# Patient Record
Sex: MALE | Race: WHITE | Employment: OTHER | URBAN - METROPOLITAN AREA
[De-identification: names, ages, dates, MRNs, and addresses within clinical notes are randomized per-mention and may not be internally consistent; named-entity substitution may affect disease eponyms.]

---

## 2019-01-01 ENCOUNTER — DISCHARGE SUMMARY NURSING HOME (OUTPATIENT)
Dept: GERIATRICS | Facility: CLINIC | Age: 84
End: 2019-01-01
Payer: COMMERCIAL

## 2019-01-01 ENCOUNTER — NURSING HOME VISIT (OUTPATIENT)
Dept: GERIATRICS | Facility: CLINIC | Age: 84
End: 2019-01-01
Payer: COMMERCIAL

## 2019-01-01 ENCOUNTER — HOSPITAL LABORATORY (OUTPATIENT)
Dept: NURSING HOME | Facility: OTHER | Age: 84
End: 2019-01-01

## 2019-01-01 ENCOUNTER — TELEPHONE (OUTPATIENT)
Dept: GERIATRICS | Facility: CLINIC | Age: 84
End: 2019-01-01

## 2019-01-01 ENCOUNTER — NURSING HOME VISIT (OUTPATIENT)
Dept: GERIATRICS | Facility: CLINIC | Age: 84
End: 2019-01-01
Payer: MEDICARE

## 2019-01-01 VITALS
HEART RATE: 70 BPM | RESPIRATION RATE: 18 BRPM | OXYGEN SATURATION: 96 % | DIASTOLIC BLOOD PRESSURE: 80 MMHG | HEIGHT: 72 IN | BODY MASS INDEX: 22.55 KG/M2 | SYSTOLIC BLOOD PRESSURE: 153 MMHG | WEIGHT: 166.5 LBS | TEMPERATURE: 97.3 F

## 2019-01-01 VITALS
BODY MASS INDEX: 21.66 KG/M2 | TEMPERATURE: 98 F | DIASTOLIC BLOOD PRESSURE: 70 MMHG | SYSTOLIC BLOOD PRESSURE: 140 MMHG | OXYGEN SATURATION: 93 % | WEIGHT: 159.7 LBS | HEART RATE: 60 BPM | RESPIRATION RATE: 18 BRPM

## 2019-01-01 VITALS
TEMPERATURE: 97.4 F | WEIGHT: 165.5 LBS | SYSTOLIC BLOOD PRESSURE: 122 MMHG | RESPIRATION RATE: 18 BRPM | BODY MASS INDEX: 22.42 KG/M2 | OXYGEN SATURATION: 94 % | HEIGHT: 72 IN | HEART RATE: 78 BPM | DIASTOLIC BLOOD PRESSURE: 58 MMHG

## 2019-01-01 VITALS
OXYGEN SATURATION: 98 % | RESPIRATION RATE: 16 BRPM | TEMPERATURE: 98.4 F | SYSTOLIC BLOOD PRESSURE: 124 MMHG | HEART RATE: 62 BPM | DIASTOLIC BLOOD PRESSURE: 60 MMHG

## 2019-01-01 VITALS
RESPIRATION RATE: 19 BRPM | DIASTOLIC BLOOD PRESSURE: 46 MMHG | OXYGEN SATURATION: 97 % | HEART RATE: 56 BPM | HEIGHT: 72 IN | SYSTOLIC BLOOD PRESSURE: 115 MMHG | BODY MASS INDEX: 23.43 KG/M2 | TEMPERATURE: 97.4 F | WEIGHT: 173 LBS

## 2019-01-01 DIAGNOSIS — Z79.899 POLYPHARMACY: ICD-10-CM

## 2019-01-01 DIAGNOSIS — E11.9 TYPE 2 DIABETES MELLITUS WITHOUT COMPLICATION, WITHOUT LONG-TERM CURRENT USE OF INSULIN (H): ICD-10-CM

## 2019-01-01 DIAGNOSIS — C91.10 CHRONIC LYMPHOCYTIC LEUKEMIA (H): ICD-10-CM

## 2019-01-01 DIAGNOSIS — M62.81 GENERALIZED MUSCLE WEAKNESS: ICD-10-CM

## 2019-01-01 DIAGNOSIS — C78.7 PANCREATIC CANCER METASTASIZED TO LIVER (H): ICD-10-CM

## 2019-01-01 DIAGNOSIS — C25.9 PANCREATIC CANCER METASTASIZED TO LIVER (H): ICD-10-CM

## 2019-01-01 DIAGNOSIS — R79.89 ELEVATED TROPONIN: ICD-10-CM

## 2019-01-01 DIAGNOSIS — R41.89 COGNITIVE IMPAIRMENT: ICD-10-CM

## 2019-01-01 DIAGNOSIS — N40.1 BENIGN PROSTATIC HYPERPLASIA WITH INCOMPLETE BLADDER EMPTYING: Primary | ICD-10-CM

## 2019-01-01 DIAGNOSIS — E11.9 TYPE 2 DIABETES MELLITUS WITHOUT COMPLICATION, WITHOUT LONG-TERM CURRENT USE OF INSULIN (H): Primary | ICD-10-CM

## 2019-01-01 DIAGNOSIS — Z51.5 HOSPICE CARE PATIENT: ICD-10-CM

## 2019-01-01 DIAGNOSIS — I48.20 CHRONIC ATRIAL FIBRILLATION (H): ICD-10-CM

## 2019-01-01 DIAGNOSIS — N40.1 BENIGN PROSTATIC HYPERPLASIA WITH INCOMPLETE BLADDER EMPTYING: ICD-10-CM

## 2019-01-01 DIAGNOSIS — C25.9 PANCREATIC CANCER METASTASIZED TO LIVER (H): Primary | ICD-10-CM

## 2019-01-01 DIAGNOSIS — Z86.718 HISTORY OF DVT (DEEP VEIN THROMBOSIS): ICD-10-CM

## 2019-01-01 DIAGNOSIS — I21.4 NSTEMI (NON-ST ELEVATED MYOCARDIAL INFARCTION) (H): ICD-10-CM

## 2019-01-01 DIAGNOSIS — Z97.8 FOLEY CATHETER PRESENT: ICD-10-CM

## 2019-01-01 DIAGNOSIS — R39.14 BENIGN PROSTATIC HYPERPLASIA WITH INCOMPLETE BLADDER EMPTYING: Primary | ICD-10-CM

## 2019-01-01 DIAGNOSIS — R39.14 BENIGN PROSTATIC HYPERPLASIA WITH INCOMPLETE BLADDER EMPTYING: ICD-10-CM

## 2019-01-01 DIAGNOSIS — C78.7 PANCREATIC CANCER METASTASIZED TO LIVER (H): Primary | ICD-10-CM

## 2019-01-01 LAB
ANION GAP SERPL CALCULATED.3IONS-SCNC: 7 MMOL/L (ref 3–14)
BUN SERPL-MCNC: 12 MG/DL (ref 7–30)
CALCIUM SERPL-MCNC: 8.9 MG/DL (ref 8.5–10.1)
CHLORIDE SERPL-SCNC: 101 MMOL/L (ref 94–109)
CO2 SERPL-SCNC: 27 MMOL/L (ref 20–32)
CREAT SERPL-MCNC: 0.68 MG/DL (ref 0.66–1.25)
ERYTHROCYTE [DISTWIDTH] IN BLOOD BY AUTOMATED COUNT: 15.3 % (ref 10–15)
ERYTHROCYTE [DISTWIDTH] IN BLOOD BY AUTOMATED COUNT: 15.9 % (ref 10–15)
GFR SERPL CREATININE-BSD FRML MDRD: 84 ML/MIN/{1.73_M2}
GLUCOSE SERPL-MCNC: 165 MG/DL (ref 70–99)
HCT VFR BLD AUTO: 30.6 % (ref 40–53)
HCT VFR BLD AUTO: 31 % (ref 40–53)
HGB BLD-MCNC: 9.5 G/DL (ref 13.3–17.7)
HGB BLD-MCNC: 9.7 G/DL (ref 13.3–17.7)
MCH RBC QN AUTO: 26.8 PG (ref 26.5–33)
MCH RBC QN AUTO: 26.8 PG (ref 26.5–33)
MCHC RBC AUTO-ENTMCNC: 31 G/DL (ref 31.5–36.5)
MCHC RBC AUTO-ENTMCNC: 31.3 G/DL (ref 31.5–36.5)
MCV RBC AUTO: 86 FL (ref 78–100)
MCV RBC AUTO: 86 FL (ref 78–100)
PLATELET # BLD AUTO: 307 10E9/L (ref 150–450)
PLATELET # BLD AUTO: 416 10E9/L (ref 150–450)
POTASSIUM SERPL-SCNC: 4.1 MMOL/L (ref 3.4–5.3)
RBC # BLD AUTO: 3.54 10E12/L (ref 4.4–5.9)
RBC # BLD AUTO: 3.62 10E12/L (ref 4.4–5.9)
SODIUM SERPL-SCNC: 135 MMOL/L (ref 133–144)
WBC # BLD AUTO: 26.4 10E9/L (ref 4–11)
WBC # BLD AUTO: 29.9 10E9/L (ref 4–11)

## 2019-01-01 PROCEDURE — 99305 1ST NF CARE MODERATE MDM 35: CPT | Mod: GW | Performed by: FAMILY MEDICINE

## 2019-01-01 PROCEDURE — 99309 SBSQ NF CARE MODERATE MDM 30: CPT | Mod: GW | Performed by: NURSE PRACTITIONER

## 2019-01-01 PROCEDURE — 99310 SBSQ NF CARE HIGH MDM 45: CPT | Performed by: NURSE PRACTITIONER

## 2019-01-01 PROCEDURE — 99316 NF DSCHRG MGMT 30 MIN+: CPT | Performed by: NURSE PRACTITIONER

## 2019-01-01 PROCEDURE — 99309 SBSQ NF CARE MODERATE MDM 30: CPT | Performed by: NURSE PRACTITIONER

## 2019-01-01 RX ORDER — ACETAMINOPHEN 325 MG/1
650 TABLET ORAL EVERY 4 HOURS PRN
COMMUNITY

## 2019-01-01 RX ORDER — ACETAMINOPHEN 500 MG
500 TABLET ORAL 3 TIMES DAILY
COMMUNITY

## 2019-01-01 RX ORDER — SENNOSIDES A AND B 8.6 MG/1
1 TABLET, FILM COATED ORAL 2 TIMES DAILY PRN
COMMUNITY
End: 2019-01-01

## 2019-01-01 RX ORDER — DOCUSATE SODIUM 100 MG/1
100 CAPSULE, LIQUID FILLED ORAL 2 TIMES DAILY PRN
COMMUNITY
End: 2019-01-01

## 2019-01-01 RX ORDER — CALCIUM CARBONATE/VITAMIN D3 500-10/5ML
1 LIQUID (ML) ORAL DAILY
COMMUNITY

## 2019-01-01 RX ORDER — CINNAMON BARK
1000 POWDER (GRAM) MISCELLANEOUS DAILY
COMMUNITY

## 2019-01-01 RX ORDER — POLYETHYLENE GLYCOL 3350 17 G/17G
17 POWDER, FOR SOLUTION ORAL DAILY PRN
COMMUNITY
End: 2019-01-01

## 2019-01-01 RX ORDER — MORPHINE SULFATE 30 MG/1
2.5 TABLET ORAL
COMMUNITY

## 2019-01-01 ASSESSMENT — MIFFLIN-ST. JEOR
SCORE: 1453.7
SCORE: 1487.72
SCORE: 1458.24

## 2019-05-23 NOTE — LETTER
5/23/2019        RE: Gerber Mayen  [address] [facility] [id number]        [facility]  PRIMARY CARE PROVIDER AND CLINIC:  [facility], [address] [facility] [address]  Chief Complaint   Patient presents with     Hospital F/U     [facility] Medical Record Number:  [id number]  Place of Service where encounter took place:  [facility] ([facility]) [[id number]]    Gerber Mayen  is a 89 year old  (11/30/1929), admitted to the above facility from  [facility] . Hospital stay 5/14/19 through 5/22/19..  Admitted to this facility for  rehab, medical management and nursing care.    HPI:    HPI information obtained from: facility chart records, facility staff, patient report, Care Everywhere Epic chart review and family/first contact son report.   Brief Summary of Hospital Course: Gerber presented to [facility] on 5/13/19 with inability to pass urine, abdominal pain and hematuria.  Found to have a WBC = 36.8.  CT showed enlarged prostate, mesenteric adenopathy and a soft tissue mass in the epigastrium measuring approx 6.4cm which was larger than measured in 2009.  multple liver lesions suggesting metastatic disease and a subacute L1 compression fracture.  Eventually transferred to [facility] due to elevated troponins without chest pain, ongoing bleeding and concerns for cardiac ischemia.    CT A/P urogram showed large upper retroperitoneal mass diffuse metastatic disease to liver.  Oncology consulted but family declined liver biopsy.  Code status discussed with patient wanting to be DNR.  Family declined Hospice before discharge from [facility].    Clinical picture suggested acute NSTEMI, but with metastatic disease, cardiology did not recommend further cardiac testing.  Updates on Status Since Skilled nursing Admission: Today meeting Gerber as he is laying on his bed.  Able to answer questions but can see the  "memory impairment with conversation.  Did call the son to introduce self and he is aware of how his story changes and seems to not be able to make some decisions.    Denied chest pain, SOB or other joint pain.  Stated his nostril bothered him but stated he thought it was his sinuses and then he denied that story.    Catheter is present and drain bag is empty.  Catheter is to be pulled in five days to see if able to void.    Goal is to get him stronger and return back to his apartment in [place] with his spouse.  Lives near the hospital in one level \"[facility]/apartment\".    Did not bring up hospice with son.  Continue to monitor how Gerber functions and can guide family what will be best for him.    CODE STATUS/ADVANCE DIRECTIVES DISCUSSION:   DNR / DNI  Patient's living condition: lives with spouse  ALLERGIES: Codeine; Diatrizoate; and Iodine  PAST MEDICAL HISTORY:  has no past medical history on file.  PAST SURGICAL HISTORY:   has a past surgical history that includes REMV CATARACT EXTRACAP,INSERT LENS (8/21/2008) and REMV CATARACT EXTRACAP,INSERT LENS (09/16/10).  FAMILY HISTORY: family history is not on file.  SOCIAL HISTORY:       Post Discharge Medication Reconciliation Status: discharge medications reconciled, continue medications without change    Current Outpatient Medications   Medication Sig Dispense Refill     acetaminophen (TYLENOL) 325 MG tablet Take 325-650 mg by mouth every 4 hours as needed for mild pain       Cinnamon Bark POWD Take 1,000 mg by mouth daily       CRANBERRY PO Take 1 tablet by mouth daily       docusate sodium (COLACE) 100 MG capsule Take 100 mg by mouth 2 times daily as needed for constipation       insulin aspart (NOVOLOG FLEXPEN) 100 UNIT/ML pen Inject Subcutaneous 3 times daily (with meals) Per Sliding Scale;   If Blood Sugar is 200 to 249, give 1 Units.  If Blood Sugar is 250 to 299, give 2 Units.  If Blood Sugar is 300 to 349, give 3 Units.  If Blood Sugar is 350 to 399, give 4 " Units.  If Blood Sugar is greater than 399, give 5 Units.  If Blood Sugar is greater than 400, call MD.       metFORMIN (GLUCOPHAGE) 1000 MG tablet Take 1,000 mg by mouth 2 times daily (with meals)       Multiple Vitamins-Minerals (MULTIVITAMIN ADULT PO) Take 1 tablet by mouth daily       polyethylene glycol (MIRALAX/GLYCOLAX) packet Take 17 g by mouth daily as needed for constipation       senna (SENOKOT) 8.6 MG tablet Take 1 tablet by mouth 2 times daily as needed for constipation       SUPER B COMPLEX/C PO Take 1 tablet by mouth daily        vitamin B-12 (CYANOCOBALAMIN) 1000 MCG tablet Take 1,000 mcg by mouth daily       vitamin D3 (CHOLECALCIFEROL) 1000 units (25 mcg) tablet Take 2,000 Units by mouth daily         ROS:  10 point ROS of systems including Constitutional, Eyes, Respiratory, Cardiovascular, Gastroenterology, Genitourinary, Integumentary, Musculoskeletal, Psychiatric were all negative except for pertinent positives noted in my HPI.    Vitals:  /58   Pulse 78   Temp 97.4  F (36.3  C)   Resp 18   Ht 1.829 m (6')   Wt 75.1 kg (165 lb 8 oz)   SpO2 94%   BMI 22.45 kg/m     Exam:  GENERAL APPEARANCE:  Alert, in no distress, cooperative, tall gentleman  EYES:  PERRL, Conjunctiva and lids normal  RESP:  respiratory effort and palpation of chest normal, lungs clear to auscultation , no respiratory distress  CV:  Palpation and auscultation of heart done , no edema, heart rate regular/irregular  ABDOMEN:  normal bowel sounds, soft, nontender, no hepatosplenomegaly or other masses, no guarding or rebound  M/S:   Gait and station abnormal staff assisting (one) with transfers and using w/c for mobility around unit.  independent with bed mobility  SKIN:  pale, warm and dry.  lower legs are discolored but no open sores  PSYCH:  oriented to person, vague with place and time, insight and judgement impaired, memory impaired , affect and mood normal    Lab/Diagnostic data:  5/20/19  K+ = 3.8, Mg =  1.7    ALBUMIN 3.2 3.2 - 4.6 g/dL [facility]     PROTEIN,TOTAL 5.3 (L) 6.0 - 8.0 g/dL [facility]     GLOBULIN  2.1 2.0 - 3.7 g/dL [facility]     A/G RATIO 1.5 1.0 - 2.0  [facility]     BILIRUBIN,TOTAL 0.4 0.2 - 1.2 mg/dL [facility]     BILIRUBIN,DIRECT 0.2 0.1 - 0.5 mg/dL [facility]     BILIRUBIN,INDIRECT  0.2 0.2 - 0.8 mg/dL [facility]     ALK PHOSPHATASE 59 50 - 136 IU/L [facility]     ALT (SGPT) 13 8 - 45 IU/L [facility]     AST (SGOT) 14        CA 19-9 5/15/19: 11 normal  PSA total 5/15/19: 3.04 normal    MICROBIOLOGY  Urine culture 5/13/19: 10-50,000 CFU/mL of multiple organisms, probable contaminants        ASSESSMENT/PLAN:  Benign prostatic hyperplasia with incomplete bladder emptying  Daly catheter present  - goal is for catheter to come out in 5 days.  From there will see if retaining urine or able to void on own.  Suggestion would be to place catheter back in knowing his prostate is enlarged and goal of care may be comfort.  Will discuss urology if needed but at the hospital, irrigation was done.  Catheter was replaced due to inability to void so cautious if he will be able to void.  Is on cranberry tab which clarified to be 500mg daily    Pancreatic cancer metastasized to liver (H) - likely  - hospital attempted to guide family with hospice care but declined.  Got the sense they are not ready emotionally for possible cancer.  Will attend therapies with goal of strengthening and discharge back to his previous living situation.  Spoke with the son who is the main contact.    Keep family informed of his status and answer questions as able.  MD/NP will follow Gerber while here.    Chronic atrial fibrillation (H)  Not  on ASA or beta blocker due to his bleeding and boarderline hypotension and bradycardia  Vitals done daily while here.  Blood pressures 110-130's/60-70's.    Watch for any symptoms.    Cognitive impairment  Does take B12 1000mcg tab daily, super B complex 1 tab daily and MVI daily.  Will need to provide safety and remind him of call light.  After being here for a few days, cognitive assessments will be done.    Type 2 diabetes mellitus without complication, without long-term current use of insulin (H)  Right now blood sugars QID and have ranged from 199-236.  Is on metformin 1000mg BID and came with a sliding scale insulin regimen.  Will keep that for now and eval his sugars next week.  Possible to get rid of the insulin.  Will do a CBC and BMP next Wednesday to evaluate overall labs       Orders written by provider at facility and transcribed by : Reema Cruz MA  1.  CBC & BMP Wednesday.  Dx: DM    Total time spent with patient visit at the [facility] nursing [facility] was 37 minutes including patient visit, review of past records and phone call to patient contact. Greater than 50% of total time spent with counseling and coordinating care due to clairfying medications, review plan of care and orientation to how the MD/NP work     Electronically signed by:  MARIANELA Weeks CNP                         Sincerely,        MARIANELA Weeks CNP

## 2019-05-24 PROBLEM — R79.89 ELEVATED TROPONIN: Status: ACTIVE | Noted: 2019-01-01

## 2019-05-24 PROBLEM — R29.6 FALLS FREQUENTLY: Status: ACTIVE | Noted: 2019-01-01

## 2019-05-24 PROBLEM — C25.9 PANCREATIC CANCER METASTASIZED TO LIVER (H): Status: ACTIVE | Noted: 2019-01-01

## 2019-05-24 PROBLEM — N40.0 BENIGN PROSTATIC HYPERPLASIA: Status: ACTIVE | Noted: 2019-01-01

## 2019-05-24 PROBLEM — E78.5 HYPERLIPIDEMIA: Status: ACTIVE | Noted: 2019-01-01

## 2019-05-24 PROBLEM — T17.908A ASPIRATION INTO AIRWAY: Status: ACTIVE | Noted: 2019-01-01

## 2019-05-24 PROBLEM — C91.10 CHRONIC LYMPHOCYTIC LEUKEMIA (H): Status: ACTIVE | Noted: 2019-01-01

## 2019-05-24 PROBLEM — C78.7 PANCREATIC CANCER METASTASIZED TO LIVER (H): Status: ACTIVE | Noted: 2019-01-01

## 2019-05-24 NOTE — PROGRESS NOTES
[facility]  PRIMARY CARE PROVIDER AND CLINIC:  [facility], [address] [facility] [address]  Chief Complaint   Patient presents with     Hospital F/U     [facility] Medical Record Number:  [id number]  Place of Service where encounter took place:  [facility] [place] ([facility]) [[id number]]    Gerber Mayen  is a 89 year old  (11/30/1929), admitted to the above facility from  [facility] . Hospital stay 5/14/19 through 5/22/19..  Admitted to this facility for  rehab, medical management and nursing care.    HPI:    HPI information obtained from: facility chart records, facility staff, patient report, Care Everywhere Epic chart review and family/first contact son report.   Brief Summary of Hospital Course: Gerber presented to [facility] on 5/13/19 with inability to pass urine, abdominal pain and hematuria.  Found to have a WBC = 36.8.  CT showed enlarged prostate, mesenteric adenopathy and a soft tissue mass in the epigastrium measuring approx 6.4cm which was larger than measured in 2009.  multple liver lesions suggesting metastatic disease and a subacute L1 compression fracture.  Eventually transferred to [facility] due to elevated troponins without chest pain, ongoing bleeding and concerns for cardiac ischemia.    CT A/P urogram showed large upper retroperitoneal mass diffuse metastatic disease to liver.  Oncology consulted but family declined liver biopsy.  Code status discussed with patient wanting to be DNR.  Family declined Hospice before discharge from [facility].    Clinical picture suggested acute NSTEMI, but with metastatic disease, cardiology did not recommend further cardiac testing.  Updates on Status Since Skilled nursing Admission: Today meeting Gerber as he is laying on his bed.  Able to answer questions but can see the memory impairment with conversation.  Did call the son to introduce self and he is aware of how his story  "changes and seems to not be able to make some decisions.    Denied chest pain, SOB or other joint pain.  Stated his nostril bothered him but stated he thought it was his sinuses and then he denied that story.    Catheter is present and drain bag is empty.  Catheter is to be pulled in five days to see if able to void.    Goal is to get him stronger and return back to his apartment in [place] with his spouse.  [facility]    Did not bring up hospice with son.  Continue to monitor how Gerber functions and can guide family what will be best for him.    CODE STATUS/ADVANCE DIRECTIVES DISCUSSION:   DNR / DNI  Patient's living condition: lives with spouse  ALLERGIES: Codeine; Diatrizoate; and Iodine  PAST MEDICAL HISTORY:  has no past medical history on file.  PAST SURGICAL HISTORY:   has a past surgical history that includes REMV CATARACT EXTRACAP,INSERT LENS (8/21/2008) and REMV CATARACT EXTRACAP,INSERT LENS (09/16/10).  FAMILY HISTORY: family history is not on file.  SOCIAL HISTORY:       Post Discharge Medication Reconciliation Status: discharge medications reconciled, continue medications without change    Current Outpatient Medications   Medication Sig Dispense Refill     acetaminophen (TYLENOL) 325 MG tablet Take 325-650 mg by mouth every 4 hours as needed for mild pain       Cinnamon Bark POWD Take 1,000 mg by mouth daily       CRANBERRY PO Take 1 tablet by mouth daily       docusate sodium (COLACE) 100 MG capsule Take 100 mg by mouth 2 times daily as needed for constipation       insulin aspart (NOVOLOG FLEXPEN) 100 UNIT/ML pen Inject Subcutaneous 3 times daily (with meals) Per Sliding Scale;   If Blood Sugar is 200 to 249, give 1 Units.  If Blood Sugar is 250 to 299, give 2 Units.  If Blood Sugar is 300 to 349, give 3 Units.  If Blood Sugar is 350 to 399, give 4 Units.  If Blood Sugar is greater than 399, give 5 Units.  If Blood Sugar is greater than 400, call MD.   "     metFORMIN (GLUCOPHAGE) 1000 MG tablet Take 1,000 mg by mouth 2 times daily (with meals)       Multiple Vitamins-Minerals (MULTIVITAMIN ADULT PO) Take 1 tablet by mouth daily       polyethylene glycol (MIRALAX/GLYCOLAX) packet Take 17 g by mouth daily as needed for constipation       senna (SENOKOT) 8.6 MG tablet Take 1 tablet by mouth 2 times daily as needed for constipation       SUPER B COMPLEX/C PO Take 1 tablet by mouth daily        vitamin B-12 (CYANOCOBALAMIN) 1000 MCG tablet Take 1,000 mcg by mouth daily       vitamin D3 (CHOLECALCIFEROL) 1000 units (25 mcg) tablet Take 2,000 Units by mouth daily         ROS:  10 point ROS of systems including Constitutional, Eyes, Respiratory, Cardiovascular, Gastroenterology, Genitourinary, Integumentary, Musculoskeletal, Psychiatric were all negative except for pertinent positives noted in my HPI.    Vitals:  /58   Pulse 78   Temp 97.4  F (36.3  C)   Resp 18   Ht 1.829 m (6')   Wt 75.1 kg (165 lb 8 oz)   SpO2 94%   BMI 22.45 kg/m    Exam:  GENERAL APPEARANCE:  Alert, in no distress, cooperative, tall gentleman  EYES:  PERRL, Conjunctiva and lids normal  RESP:  respiratory effort and palpation of chest normal, lungs clear to auscultation , no respiratory distress  CV:  Palpation and auscultation of heart done , no edema, heart rate regular/irregular  ABDOMEN:  normal bowel sounds, soft, nontender, no hepatosplenomegaly or other masses, no guarding or rebound  M/S:   Gait and station abnormal staff assisting (one) with transfers and using w/c for mobility around unit.  independent with bed mobility  SKIN:  pale, warm and dry.  lower legs are discolored but no open sores  PSYCH:  oriented to person, vague with place and time, insight and judgement impaired, memory impaired , affect and mood normal    Lab/Diagnostic data:  5/20/19  K+ = 3.8, Mg = 1.7    ALBUMIN 3.2 3.2 - 4.6 g/dL [facility]     PROTEIN,TOTAL 5.3 (L) 6.0 - 8.0  g/dL [facility]     GLOBULIN  2.1 2.0 - 3.7 g/dL [facility]     A/G RATIO 1.5 1.0 - 2.0  [facility]     BILIRUBIN,TOTAL 0.4 0.2 - 1.2 mg/dL [facility]     BILIRUBIN,DIRECT 0.2 0.1 - 0.5 mg/dL [facility]     BILIRUBIN,INDIRECT  0.2 0.2 - 0.8 mg/dL [facility]     ALK PHOSPHATASE 59 50 - 136 IU/L [facility]     ALT (SGPT) 13 8 - 45 IU/L [facility]     AST (SGOT) 14        CA 19-9 5/15/19: 11 normal  PSA total 5/15/19: 3.04 normal    MICROBIOLOGY  Urine culture 5/13/19: 10-50,000 CFU/mL of multiple organisms, probable contaminants        ASSESSMENT/PLAN:  Benign prostatic hyperplasia with incomplete bladder emptying  Daly catheter present  - goal is for catheter to come out in 5 days.  From there will see if retaining urine or able to void on own.  Suggestion would be to place catheter back in knowing his prostate is enlarged and goal of care may be comfort.  Will discuss urology if needed but at the hospital, irrigation was done.  Catheter was replaced due to inability to void so cautious if he will be able to void.  Is on cranberry tab which clarified to be 500mg daily    Pancreatic cancer metastasized to liver (H) - likely  - hospital attempted to guide family with hospice care but declined.  Got the sense they are not ready emotionally for possible cancer.  Will attend therapies with goal of strengthening and discharge back to his previous living situation.  Spoke with the son who is the main contact.    Keep family informed of his status and answer questions as able.  MD/NP will follow Gerber while here.    Chronic atrial fibrillation (H)  Not on ASA or beta blocker due to his bleeding and boarderline hypotension and bradycardia  Vitals done daily while  here.  Blood pressures 110-130's/60-70's.    Watch for any symptoms.    Cognitive impairment  Does take B12 1000mcg tab daily, super B complex 1 tab daily and MVI daily.  Will need to provide safety and remind him of call light.  After being here for a few days, cognitive assessments will be done.    Type 2 diabetes mellitus without complication, without long-term current use of insulin (H)  Right now blood sugars QID and have ranged from 199-236.  Is on metformin 1000mg BID and came with a sliding scale insulin regimen.  Will keep that for now and eval his sugars next week.  Possible to get rid of the insulin.  Will do a CBC and BMP next Wednesday to evaluate overall labs       Orders written by provider at facility and transcribed by : Reema Cruz MA  1.  CBC & BMP Wednesday.  Dx: DM    Total time spent with patient visit at the skilled nursing facility was 37 minutes including patient visit, review of past records and phone call to patient contact. Greater than 50% of total time spent with counseling and coordinating care due to clairfying medications, review plan of care and orientation to how the MD/NP work     Electronically signed by:  MARIANELA Weeks CNP

## 2019-05-29 NOTE — PROGRESS NOTES
[facility]  [facility] Medical Record Number:  [id number]  Place of Service where encounter took place:  [facility] [[id number]]  Chief Complaint   Patient presents with     Nursing Home Acute       HPI:    Gerber Mayen  is a 89 year old (11/30/1929), who is being seen today for an episodic care visit.  HPI information obtained from: facility chart records, facility staff and patient report. Today's concern is:    1. Type 2 diabetes mellitus without complication, without long-term current use of insulin (H)    2. Benign prostatic hyperplasia with incomplete bladder emptying    3. Daly catheter present      - nursing updating this NP that Gerber's catheter is out but retaining urine.  This time >500 via PVR and able to cath him for 450.  Discussing what should be done if he continues to retain large amounts.  Staff also noted his blood sugars elevated and so evaluating them today.  Received a note from nursing asking for some PRN medications to be discontinued due to not needing them.      Past Medical and Surgical History reviewed in Epic today.    MEDICATIONS:  Current Outpatient Medications   Medication Sig Dispense Refill     acetaminophen (TYLENOL) 325 MG tablet Take 325-650 mg by mouth every 4 hours as needed for mild pain       Cinnamon Bark POWD Take 1,000 mg by mouth daily       CRANBERRY PO Take 1 tablet by mouth daily       docusate sodium (COLACE) 100 MG capsule Take 100 mg by mouth 2 times daily as needed for constipation       insulin aspart (NOVOLOG FLEXPEN) 100 UNIT/ML pen Inject Subcutaneous 3 times daily (with meals) Per Sliding Scale;   If Blood Sugar is 200 to 249, give 1 Units.  If Blood Sugar is 250 to 299, give 2 Units.  If Blood Sugar is 300 to 349, give 3 Units.  If Blood Sugar is 350 to 399, give 4 Units.  If Blood Sugar is greater than 399, give 5 Units.  If Blood Sugar is greater than 400, call MD.       insulin glargine (LANTUS SOLOSTAR  PEN) 100 UNIT/ML pen Inject 8 Units Subcutaneous every morning       metFORMIN (GLUCOPHAGE) 1000 MG tablet Take 1,000 mg by mouth 2 times daily (with meals)       Multiple Vitamins-Minerals (MULTIVITAMIN ADULT PO) Take 1 tablet by mouth daily       polyethylene glycol (MIRALAX/GLYCOLAX) packet Take 17 g by mouth daily as needed for constipation       senna (SENOKOT) 8.6 MG tablet Take 1 tablet by mouth 2 times daily as needed for constipation       SUPER B COMPLEX/C PO Take 1 tablet by mouth daily        vitamin B-12 (CYANOCOBALAMIN) 1000 MCG tablet Take 1,000 mcg by mouth daily       vitamin D3 (CHOLECALCIFEROL) 1000 units (25 mcg) tablet Take 2,000 Units by mouth daily         REVIEW OF SYSTEMS:  4 point ROS including Respiratory, CV, GI and , other than that noted in the HPI,  is negative    Objective:  /80   Pulse 70   Temp 97.3  F (36.3  C)   Resp 18   Ht 1.829 m (6')   Wt 75.5 kg (166 lb 8 oz)   SpO2 96%   BMI 22.58 kg/m    Exam:  Tall gentleman up in the wheelchair and propels self with his feet.  Heart rate regular/irregular.  No visible distress.  Lungs are clear.    Abdomen soft and round.  No pain with palpation.    Labs:   Component      Latest Ref Rng & Units 5/29/2019   Sodium      133 - 144 mmol/L 135   Potassium      3.4 - 5.3 mmol/L 4.1   Chloride      94 - 109 mmol/L 101   Carbon Dioxide      20 - 32 mmol/L 27   Anion Gap      3 - 14 mmol/L 7   Glucose      70 - 99 mg/dL 165 (H)   Urea Nitrogen      7 - 30 mg/dL 12   Creatinine      0.66 - 1.25 mg/dL 0.68   GFR Estimate      >60 mL/min/1.73:m2 84   GFR Estimate If Black      >60 mL/min/1.73:m2 >90   Calcium      8.5 - 10.1 mg/dL 8.9   WBC      4.0 - 11.0 10e9/L 26.4 (H)   RBC Count      4.4 - 5.9 10e12/L 3.62 (L)   Hemoglobin      13.3 - 17.7 g/dL 9.7 (L)   Hematocrit      40.0 - 53.0 % 31.0 (L)   MCV      78 - 100 fl 86   MCH      26.5 - 33.0 pg 26.8   MCHC      31.5 - 36.5 g/dL 31.3 (L)   RDW      10.0 - 15.0 % 15.3 (H)   Platelet  Count      150 - 450 10e9/L 416     Blood sugars QID  7am = 190-210's  12N = 270-310's  5pm = 190-240's  HS = 210-310's      ASSESSMENT/PLAN:  1. Type 2 diabetes mellitus without complication, without long-term current use of insulin (H)    2. Benign prostatic hyperplasia with incomplete bladder emptying    3. Daly catheter present    4. Chronic lymphocytic leukemia (H)      1.  No insulin for Gerber and feel with his sugars as they are he would benefit from better control.  Given that he may have cancer and a host of other issues, sugars can trend up vs being in control.  Remains on Metformin 1000mg BID and Cinnamon Bark    2.  Decided to give an order that if need to cath one more time then leave a catheter in.  Spoke with Wally son and updated him of the insulin as well as the catheter.  He agreed to have his dad see a urologist to see if everything is appropriate or their suggestion.  Son would rather him be seen in town here.    3.  CBC done and updated son of the WBC and relating this to the CLL.  He said that they were going to continue to monitor labs for his CLL.  Will have a CBC done in one week.      Orders written by provider at facility and transcribed by : Reema Cruz MA  1.  Lantus 8 units subcutaneous Q am.  Dx: DM2  2.  Leave catheter in if needed to straight cath 1 more time.  Dx: obstructive uropathy, BPH  3.  Follow up with Urology in clinic to make sure appropriate.  4.  Discontinue PRN Tylenol, Colace, Senna, Miralax.  5.  CBC next week Wednesday.  Dx: CLL, DM      Electronically signed by:  MARIANELA Weeks CNP

## 2019-05-29 NOTE — LETTER
5/29/2019        RE: Gerber Mayen  [address] [facility] [id number]        [facility]  [place] [facility] Record Number:  [id number]  Place of Service where encounter took place:  [facility] [[id number]]  Chief Complaint   Patient presents with     Nursing Home Acute       HPI:    Gerber Mayen  is a 89 year old (11/30/1929), who is being seen today for an episodic care visit.  HPI information obtained from: facility chart records, facility staff and patient report. Today's concern is:    1. Type 2 diabetes mellitus without complication, without long-term current use of insulin (H)    2. Benign prostatic hyperplasia with incomplete bladder emptying    3. Daly catheter present      - nursing updating this NP that Gerber's catheter is out but retaining urine.  This time >500 via PVR and able to cath him for 450.  Discussing what should be done if he continues to retain large amounts.  Staff also noted his blood sugars elevated and so evaluating them today.  Received a note from nursing asking for some PRN medications to be discontinued due to not needing them.      Past Medical and Surgical History reviewed in Epic today.    MEDICATIONS:  Current Outpatient Medications   Medication Sig Dispense Refill     acetaminophen (TYLENOL) 325 MG tablet Take 325-650 mg by mouth every 4 hours as needed for mild pain       Cinnamon Bark POWD Take 1,000 mg by mouth daily       CRANBERRY PO Take 1 tablet by mouth daily       docusate sodium (COLACE) 100 MG capsule Take 100 mg by mouth 2 times daily as needed for constipation       insulin aspart (NOVOLOG FLEXPEN) 100 UNIT/ML pen Inject Subcutaneous 3 times daily (with meals) Per Sliding Scale;   If Blood Sugar is 200 to 249, give 1 Units.  If Blood Sugar is 250 to 299, give 2 Units.  If Blood Sugar is 300 to 349, give 3 Units.  If Blood Sugar is 350 to 399, give 4 Units.  If Blood Sugar is greater  than 399, give 5 Units.  If Blood Sugar is greater than 400, call MD.       insulin glargine (LANTUS SOLOSTAR PEN) 100 UNIT/ML pen Inject 8 Units Subcutaneous every morning       metFORMIN (GLUCOPHAGE) 1000 MG tablet Take 1,000 mg by mouth 2 times daily (with meals)       Multiple Vitamins-Minerals (MULTIVITAMIN ADULT PO) Take 1 tablet by mouth daily       polyethylene glycol (MIRALAX/GLYCOLAX) packet Take 17 g by mouth daily as needed for constipation       senna (SENOKOT) 8.6 MG tablet Take 1 tablet by mouth 2 times daily as needed for constipation       SUPER B COMPLEX/C PO Take 1 tablet by mouth daily        vitamin B-12 (CYANOCOBALAMIN) 1000 MCG tablet Take 1,000 mcg by mouth daily       vitamin D3 (CHOLECALCIFEROL) 1000 units (25 mcg) tablet Take 2,000 Units by mouth daily         REVIEW OF SYSTEMS:  4 point ROS including Respiratory, CV, GI and , other than that noted in the HPI,  is negative    Objective:  /80   Pulse 70   Temp 97.3  F (36.3  C)   Resp 18   Ht 1.829 m (6')   Wt 75.5 kg (166 lb 8 oz)   SpO2 96%   BMI 22.58 kg/m     Exam:  Tall gentleman up in the wheelchair and propels self with his feet.  Heart rate regular/irregular.  No visible distress.  Lungs are clear.    Abdomen soft and round.  No pain with palpation.    Labs:   Component      Latest Ref Rng & Units 5/29/2019   Sodium      133 - 144 mmol/L 135   Potassium      3.4 - 5.3 mmol/L 4.1   Chloride      94 - 109 mmol/L 101   Carbon Dioxide      20 - 32 mmol/L 27   Anion Gap      3 - 14 mmol/L 7   Glucose      70 - 99 mg/dL 165 (H)   Urea Nitrogen      7 - 30 mg/dL 12   Creatinine      0.66 - 1.25 mg/dL 0.68   GFR Estimate      >60 mL/min/1.73:m2 84   GFR Estimate If Black      >60 mL/min/1.73:m2 >90   Calcium      8.5 - 10.1 mg/dL 8.9   WBC      4.0 - 11.0 10e9/L 26.4 (H)   RBC Count      4.4 - 5.9 10e12/L 3.62 (L)   Hemoglobin      13.3 - 17.7 g/dL 9.7 (L)   Hematocrit      40.0 - 53.0 % 31.0 (L)   MCV      78 - 100 fl 86    MCH      26.5 - 33.0 pg 26.8   MCHC      31.5 - 36.5 g/dL 31.3 (L)   RDW      10.0 - 15.0 % 15.3 (H)   Platelet Count      150 - 450 10e9/L 416     Blood sugars QID  7am = 190-210's  12N = 270-310's  5pm = 190-240's  HS = 210-310's      ASSESSMENT/PLAN:  1. Type 2 diabetes mellitus without complication, without long-term current use of insulin (H)    2. Benign prostatic hyperplasia with incomplete bladder emptying    3. Daly catheter present    4. Chronic lymphocytic leukemia (H)      1.  No insulin for Gerber and feel with his sugars as they are he would benefit from better control.  Given that he may have cancer and a host of other issues, sugars can trend up vs being in control.  Remains on Metformin 1000mg BID and Cinnamon Bark    2.  Decided to give an order that if need to cath one more time then leave a catheter in.  Spoke with Wally son and updated him of the insulin as well as the catheter.  He agreed to have his dad see a urologist to see if everything is appropriate or their suggestion.  Son would rather him be seen in town here.    3.  CBC done and updated son of the WBC and relating this to the CLL.  He said that they were going to continue to monitor labs for his CLL.  Will have a CBC done in one week.      Orders written by provider at facility and transcribed by : Reema Cruz MA  1.  Lantus 8 units subcutaneous Q am.  Dx: DM2  2.  Leave catheter in if needed to straight cath 1 more time.  Dx: obstructive uropathy, BPH  3.  Follow up with Urology in clinic to make sure appropriate.  4.  Discontinue PRN Tylenol, Colace, Senna, Miralax.  5.  CBC next week Wednesday.  Dx: CLL, DM      Electronically signed by:  MARIANELA Weeks CNP               Sincerely,        MARIANELA Weeks CNP

## 2019-06-03 NOTE — PROGRESS NOTES
[facility] DISCHARGE SUMMARY  PATIENT'S NAME: Gerber Mayen  YOB: 1929  MEDICAL RECORD NUMBER:  [id number]  Place of Service where encounter took place:  [facility] [[id number]]    PRIMARY CARE PROVIDER AND CLINIC RESPONSIBLE AFTER TRANSFER:   [facility], [address] [place] [id number]    Non-[facility] Provider     Transferring providers: MARIANELA Smith CNP, Yuri Presley MD  Recent Hospitalization/ED:  Hospital  [facility]  stay 5/14/19 to 5/22/19.  Date of SNF Admission: May / 22 / 2019  Date of SNF (anticipated) Discharge: June / 06 / 2019  Discharged to: previous independent home  Cognitive Scores: BIMS: 9/15  Physical Function: Ambulating 120 ft with 2WW, SBA and STS transfers with SBA-CGA, fatgiued with matthew xercises requiring rst between tasks, Ind for hygiene  DME: Walker    From TCU Admission note:  Gerber presented to [facility] on 5/13/19 with inability to pass urine, abdominal pain and hematuria.  Found to have a WBC = 36.8.  CT showed enlarged prostate, mesenteric adenopathy and a soft tissue mass in the epigastrium measuring approx 6.4cm which was larger than measured in 2009.  multple liver lesions suggesting metastatic disease and a subacute L1 compression fracture.  Eventually transferred to [facility] due to elevated troponins without chest pain, ongoing bleeding and concerns for cardiac ischemia.    CT A/P urogram showed large upper retroperitoneal mass diffuse metastatic disease to liver.  Oncology consulted but family declined liver biopsy.  Code status discussed with patient wanting to be DNR.  Family declined Hospice before discharge from [facility].    Clinical picture suggested acute NSTEMI, but with metastatic disease, cardiology did not recommend further cardiac testing.      CODE STATUS/ADVANCE DIRECTIVES DISCUSSION:  DNR / DNI   ALLERGIES: Codeine; Diatrizoate; and  Iodine    DISCHARGE DIAGNOSIS/NURSING FACILITY COURSE:   Benign prostatic hyperplasia with incomplete bladder emptying  Presented with hematuria, inability to void, abdominal pain  History of multiple cystoscopies  CT showing enlarged prostate, mesenteric adenopathy, soft tissue mass in epigastrium (approx 6.4 cm, larger than 2009), multiple liver lesion.  Admitted for CBI and transferred to [facility]  At [facility]: CT showing large upper retroperitoneal mass, diffuse metastatic disease to liver - see below.     At TCU: Daly was DC'd, PVRs were then 200-350 ml. Ptd eclined having straight cathing or Daly replaced.  Patient reports voiding today without feelings of retention (but did qualify that when the bladder scan said there was retained urine, he didn't feel he did).     PLAN:  - remains on PTA Cinnamon bark and cranberry supplements  - f/u with Urology as planned     Pancreatic cancer metastasized to liver (H) - likely  At [facility]: CT showing large upper retroperitoneal mass, diffuse metastatic disease to liver  Family declined Bx   Hospice recommended but family still deciding  Patient reporting he is praying about his cancer but otherwise is not worrying about it.      PLAN:  - f/u with Oncology as planned     Chronic lymphocytic leukemia (H)  New Dx 4/25/19   F/b Dr Rousseau, Heme[facility]Onc [facility]  BL Hgb ~10  On PTA Vitamin B12 1000 cg daily, Super B complex daily, MV daily  3/28/19 Vitamin B12 level - 1405    5/14/19: Hgb 9.7, WBC 33.8, Plt 217  5/17/19: Hgb 9.0  5/29/19: Hgb 9.7, WBC 26.4, Plt 416  6/5/19: Hgb 9.5, WBC 29.9, Plt 307     PLAN:  - f/u with Heme-Onc as planned     NSTEMI (non-ST elevated myocardial infarction) (H)  Elevated troponin  New this admission,   Had unresponsive episode with possible aspiration (treated with Zosyn)  Trop peak 0.302  EKG with ST depression in lateral leads  Patient denied CP, ECHO with EF 50-55%  Cardiology consulted and recommended in setting of metastatic  disease, no work-up  No ASA d/t bleeding    BPs 110-130s/40-60s  HRs 56-63  On no meds  Patient declined CP, HA, lightheadedness     PLAN:  - monitor for CP  - f/u with Primary Care Provider    Type 2 diabetes mellitus without complication, without long-term current use of insulin (H)  3/28/19 A1C- 7.8  On PTA Metformin 1000 mg BID  (new) Lantus 8 units q AM (started 5/29/19)  (new) sliding scale insulin   Patient today reporting he does not plan to do any insulin dosing at home and declines learning how to administer    Blood glucose monitoring:  AM: 155-193 (0-1 sliding scale insulin)  Lunch: 183-238 (0-1 sliding scale insulin)  Dinner: 189-209 (1 sliding scale insulin)  HS: 132- 252    No ASA d/t bleeding, no ACEI d/t borderline HoTN    PLAN:  - continue  PTA Metformin 1000 mg BID  - discontinue (new) Lantus 8 units q AM as patient declines   - discontinue (new) sliding scale insulin) as patient declines     Chronic atrial fibrillation (H)  History of DVT (deep vein thrombosis) - recurrent, prev on Coumadin)  No ASA d/t bleeding  No BB d/t borderline HoTN and bradycardia  BPs 110-130s/40-60s  HRs 56-63, regular today    PLAN:  - Continue no OA d/t bleeding  - continue no BB d/t bradycardia and borderline HoTN    Cognitive impairment  BIMS in TCU 9/15  Patient A&O x 2 today (self, date, not location nor reason for admission).    Generalized muscle weakness  In setting of chronic illness with new acute disease processes  THerapy reporting patient ambulating 120 ft with 2WW, CBA-Supervision  STS transfers with SBA-CGA  Patient gets fatigued with all exercises, requesting rest periods between tasks  Ind for hygiene    Past Medical History:  has no past medical history on file.    Discharge Medications:  Current Outpatient Medications   Medication Sig Dispense Refill     Cinnamon Bark POWD Take 1,000 mg by mouth daily       CRANBERRY PO Take 1 tablet by mouth daily       insulin aspart (NOVOLOG FLEXPEN) 100 UNIT/ML  pen Inject Subcutaneous 3 times daily (with meals) Per Sliding Scale;   If Blood Sugar is 200 to 249, give 1 Units.  If Blood Sugar is 250 to 299, give 2 Units.  If Blood Sugar is 300 to 349, give 3 Units.  If Blood Sugar is 350 to 399, give 4 Units.  If Blood Sugar is greater than 399, give 5 Units.  If Blood Sugar is greater than 400, call MD.       insulin glargine (LANTUS SOLOSTAR PEN) 100 UNIT/ML pen Inject 8 Units Subcutaneous every morning       metFORMIN (GLUCOPHAGE) 1000 MG tablet Take 1,000 mg by mouth 2 times daily (with meals)       Multiple Vitamins-Minerals (MULTIVITAMIN ADULT PO) Take 1 tablet by mouth daily       psyllium (METAMUCIL/KONSYL) 58.6 % powder Take 1 Tablespoonful by mouth daily       SUPER B COMPLEX/C PO Take 1 tablet by mouth daily        vitamin B-12 (CYANOCOBALAMIN) 1000 MCG tablet Take 1,000 mcg by mouth daily       vitamin D3 (CHOLECALCIFEROL) 1000 units (25 mcg) tablet Take 2,000 Units by mouth daily       acetaminophen (TYLENOL) 325 MG tablet Take 325-650 mg by mouth every 4 hours as needed for mild pain       docusate sodium (COLACE) 100 MG capsule Take 100 mg by mouth 2 times daily as needed for constipation       polyethylene glycol (MIRALAX/GLYCOLAX) packet Take 17 g by mouth daily as needed for constipation       senna (SENOKOT) 8.6 MG tablet Take 1 tablet by mouth 2 times daily as needed for constipation         Medication Changes/Rationale:     All (new) bowel meds DC'd in TCU d/t loose stools    (new) Metamucil 1 Tbsp daily for loose stools    Discontinue Lantus.    Discontinue sliding scale Novolog.    Controlled medications sent with patient:   not applicable/none     ROS:   Limited secondary to cognitive impairment but today pt reports 10 point ROS of systems including Constitutional, Eyes, Respiratory, Cardiovascular, Gastroenterology, Genitourinary, Integumentary, Musculoskeletal, Psychiatric were all negative except for pertinent positives noted in my HPI.    Physical  Exam:   Vitals: /46   Pulse 56   Temp 97.4  F (36.3  C)   Resp 19   Ht 1.829 m (6')   Wt 78.5 kg (173 lb)   SpO2 97%   BMI 23.46 kg/m    BMI= Body mass index is 23.46 kg/m .  GENERAL APPEARANCE:  Alert, in no distress, thin  RESP:  respiratory effort and palpation of chest normal, auscultation of lungs clear , no respiratory distress  CV:  Palpation and auscultation of heart done , rate and rhythm regular, no murmur, no LE peripheral edema, significant PVD changes present  ABDOMEN:  normal bowel sounds, soft, nontender, no hepatosplenomegaly or other masses  M/S:   Gait and station ambulating with walker, Digits and nails with arthritic changes, reduced muscle mass  SKIN:  Inspection and Palpation of skin and subcutaneous tissue with significant PVD changes present, pale  PSYCH:  insight and judgement, memory with impairment, affect and mood normal, follows commands readily         SNF labs:   CBC RESULTS:   Recent Labs   Lab Test 06/05/19  0705 05/29/19  0738   WBC 29.9* 26.4*   RBC 3.54* 3.62*   HGB 9.5* 9.7*   HCT 30.6* 31.0*   MCV 86 86   MCH 26.8 26.8   MCHC 31.0* 31.3*   RDW 15.9* 15.3*    416       Last Basic Metabolic Panel:  Recent Labs   Lab Test 05/29/19  0738      POTASSIUM 4.1   CHLORIDE 101   PREETI 8.9   CO2 27   BUN 12   CR 0.68   *       DISCHARGE PLAN:    Follow up labs: per Heme-Onc recommendations     Medical Follow Up:      Follow up with primary care provider in 1-2 weeks, Follow up with specialist as planned     MTM referral needed and placed by this provider: No    Current [place] scheduled appointments: N/A    Discharge Services: Home Care:  Occupational Therapy, Physical Therapy, Registered Nurse and Home Health Aide      TOTAL DISCHARGE TIME:   Greater than 30 minutes  Electronically signed by:  MARIANELA Smith CNP           Documentation of Face-to-Face and Certification for Home Health Services     Patient: Gerber Mayen   Date of Birth:  11/30/1929  MR Number: [id number]  Today's Date: 6/4/2019    I certify that patient: Gerber Mayen is under my care and that I, or a nurse practitioner or physician's assistant working with me, had a face-to-face encounter that meets the physician face-to-face encounter requirements with this patient on: 6/5/2019.    This encounter with the patient was in whole, or in part, for the following medical condition, which is the primary reason for home health care: weakness, CLL, BPH with urinary retention, pancreatic cancer with mets to liver (likely), afib, DM2.    I certify that, based on my findings, the following services are medically necessary home health services: Nursing, Occupational Therapy and Physical Therapy.    My clinical findings support the need for the above services because: Nurse is needed: To assess VS after changes in medications or other medical regimen. and for teaching regarding DM2 management as insulin had been prescribed in TCU but pt declined doing at home. Also monitoring for urinary retention.., Occupational Therapy Services are needed to assess and treat cognitive ability and address ADL safety due to impairment in ADL independence. and Physical Therapy Services are needed to assess and treat the following functional impairments: ADL indpendence, mobility, .    Further, I certify that my clinical findings support that this patient is homebound (i.e. absences from home require considerable and taxing effort and are for medical reasons or [employer] services or infrequently or of short duration when for other reasons) because: Requires assistance of another person or specialized equipment to access medical services because patient: Is prone to wander/get lost without assistance., Is unable to exit home safely on own due to: cognitive impairment, limited mobility., Is unable to operate assistive equipment on their own., Is unable to walk greater than 120 feet without rest., Range of  motion limitations prevents ability to exit home safely. and Requires supervision of another for safe transfer...    Based on the above findings. I certify that this patient is confined to the home and needs intermittent skilled nursing care, physical therapy and/or speech therapy.  The patient is under my care, and I have initiated the establishment of the plan of care.  This patient will be followed by a physician who will periodically review the plan of care.  Physician/Provider to provide follow up care: Luis Felipe Hernandez    Responsible Medicare certified P[employer] Physician: Yuri Presley MD  Physician Signature: See electronic signature associated with these discharge orders.  Date: 6/4/2019    Electronically Signed by Dr Yuri Presley on 06/05/19

## 2019-06-05 NOTE — LETTER
6/5/2019        RE: Gerber Mayen  [address] [facility], [place] [address]        [place] [facility] DISCHARGE SUMMARY  PATIENT'S NAME: Gerber Mayen  YOB: 1929  MEDICAL RECORD NUMBER:  [id number]  Place of Service where encounter took place:  [facility] ([facility]) [[id number]]    PRIMARY CARE PROVIDER AND CLINIC RESPONSIBLE AFTER TRANSFER:   Luis Felipe [facility], [address] [facility] / [facility] [address]    Non-FMG Provider     Transferring providers: MARIANELA Smith CNP, uYri Presley MD  Recent Hospitalization/ED:  [facility]  stay 5/14/19 to 5/22/19.  Date of SNF Admission: May / 22 / 2019  Date of SNF (anticipated) Discharge: June / 06 / 2019  Discharged to: previous independent home  Cognitive Scores: BIMS: 9/15  Physical Function: Ambulating 120 ft with 2WW, SBA and STS transfers with SBA-CGA, fatgiued with matthew xercises requiring rst between tasks, Ind for hygiene  DME: Walker    From TCU Admission note:  Gerber presented to [facility] on 5/13/19 with inability to pass urine, abdominal pain and hematuria.  Found to have a WBC = 36.8.  CT showed enlarged prostate, mesenteric adenopathy and a soft tissue mass in the epigastrium measuring approx 6.4cm which was larger than measured in 2009.  multple liver lesions suggesting metastatic disease and a subacute L1 compression fracture.  Eventually transferred to [facility] due to elevated troponins without chest pain, ongoing bleeding and concerns for cardiac ischemia.    CT A/P urogram showed large upper retroperitoneal mass diffuse metastatic disease to liver.  Oncology consulted but family declined liver biopsy.  Code status discussed with patient wanting to be DNR.  Family declined Hospice before discharge from [facility].    Clinical picture suggested acute NSTEMI, but with metastatic disease, cardiology did not recommend further cardiac  testing.      CODE STATUS/ADVANCE DIRECTIVES DISCUSSION:  DNR / DNI   ALLERGIES: Codeine; Diatrizoate; and Iodine    DISCHARGE DIAGNOSIS/NURSING FACILITY COURSE:   Benign prostatic hyperplasia with incomplete bladder emptying  Presented with hematuria, inability to void, abdominal pain  History of multiple cystoscopies  CT showing enlarged prostate, mesenteric adenopathy, soft tissue mass in epigastrium (approx 6.4 cm, larger than 2009), multiple liver lesion.  Admitted for CBI and transferred to [facility]  At [facility]: CT showing large upper retroperitoneal mass, diffuse metastatic disease to liver - see below.     At TCU: Daly was DC'd, PVRs were then 200-350 ml. Ptd eclined having straight cathing or Daly replaced.  Patient reports voiding today without feelings of retention (but did qualify that when the bladder scan said there was retained urine, he didn't feel he did).     PLAN:  - remains on PTA Cinnamon bark and cranberry supplements  - f/u with Urology as planned     Pancreatic cancer metastasized to liver (H) - likely  At [facility]: CT showing large upper retroperitoneal mass, diffuse metastatic disease to liver  Family declined Bx   Hospice recommended but family still deciding  Patient reporting he is praying about his cancer but otherwise is not worrying about it.      PLAN:  - f/u with Oncology as planned     Chronic lymphocytic leukemia (H)  New Dx 4/25/19   F/b Dr Rousseau, Heme-Onc [facility]  BL Hgb ~10  On PTA Vitamin B12 1000 cg daily, Super B complex daily, MV daily  3/28/19 Vitamin B12 level - 1405    5/14/19: Hgb 9.7, WBC 33.8, Plt 217  5/17/19: Hgb 9.0  5/29/19: Hgb 9.7, WBC 26.4, Plt 416  6/5/19: Hgb 9.5, WBC 29.9, Plt 307     PLAN:  - f/u with Heme-Onc as planned     NSTEMI (non-ST elevated myocardial infarction) (H)  Elevated troponin  New this admission,   Had unresponsive episode with possible aspiration (treated with Zosyn)  Trop peak 0.302  EKG with ST depression in lateral  leads  Patient denied CP, ECHO with EF 50-55%  Cardiology consulted and recommended in setting of metastatic disease, no work-up  No ASA d/t bleeding    BPs 110-130s/40-60s  HRs 56-63  On no meds  Patient declined CP, HA, lightheadedness     PLAN:  - monitor for CP  - f/u with Primary Care Provider    Type 2 diabetes mellitus without complication, without long-term current use of insulin (H)  3/28/19 A1C- 7.8  On PTA Metformin 1000 mg BID  (new) Lantus 8 units q AM (started 5/29/19)  (new) sliding scale insulin   Patient today reporting he does not plan to do any insulin dosing at home and declines learning how to administer    Blood glucose monitoring:  AM: 155-193 (0-1 sliding scale insulin)  Lunch: 183-238 (0-1 sliding scale insulin)  Dinner: 189-209 (1 sliding scale insulin)  HS: 132- 252    No ASA d/t bleeding, no ACEI d/t borderline HoTN    PLAN:  - continue  PTA Metformin 1000 mg BID  - discontinue (new) Lantus 8 units q AM as patient declines   - discontinue (new) sliding scale insulin) as patient declines     Chronic atrial fibrillation (H)  History of DVT (deep vein thrombosis) - recurrent, prev on Coumadin)  No ASA d/t bleeding  No BB d/t borderline HoTN and bradycardia  BPs 110-130s/40-60s  HRs 56-63, regular today    PLAN:  - Continue no OA d/t bleeding  - continue no BB d/t bradycardia and borderline HoTN    Cognitive impairment  BIMS in TCU 9/15  Patient A&O x 2 today (self, date, not location nor reason for admission).    Generalized muscle weakness  In setting of chronic illness with new acute disease processes  THerapy reporting patient ambulating 120 ft with 2WW, CBA-Supervision  STS transfers with SBA-CGA  Patient gets fatigued with all exercises, requesting rest periods between tasks  Ind for hygiene    Past Medical History:  has no past medical history on file.    Discharge Medications:  Current Outpatient Medications   Medication Sig Dispense Refill     Cinnamon Bark POWD Take 1,000 mg by  mouth daily       CRANBERRY PO Take 1 tablet by mouth daily       insulin aspart (NOVOLOG FLEXPEN) 100 UNIT/ML pen Inject Subcutaneous 3 times daily (with meals) Per Sliding Scale;   If Blood Sugar is 200 to 249, give 1 Units.  If Blood Sugar is 250 to 299, give 2 Units.  If Blood Sugar is 300 to 349, give 3 Units.  If Blood Sugar is 350 to 399, give 4 Units.  If Blood Sugar is greater than 399, give 5 Units.  If Blood Sugar is greater than 400, call MD.       insulin glargine (LANTUS SOLOSTAR PEN) 100 UNIT/ML pen Inject 8 Units Subcutaneous every morning       metFORMIN (GLUCOPHAGE) 1000 MG tablet Take 1,000 mg by mouth 2 times daily (with meals)       Multiple Vitamins-Minerals (MULTIVITAMIN ADULT PO) Take 1 tablet by mouth daily       psyllium (METAMUCIL/KONSYL) 58.6 % powder Take 1 Tablespoonful by mouth daily       SUPER B COMPLEX/C PO Take 1 tablet by mouth daily        vitamin B-12 (CYANOCOBALAMIN) 1000 MCG tablet Take 1,000 mcg by mouth daily       vitamin D3 (CHOLECALCIFEROL) 1000 units (25 mcg) tablet Take 2,000 Units by mouth daily       acetaminophen (TYLENOL) 325 MG tablet Take 325-650 mg by mouth every 4 hours as needed for mild pain       docusate sodium (COLACE) 100 MG capsule Take 100 mg by mouth 2 times daily as needed for constipation       polyethylene glycol (MIRALAX/GLYCOLAX) packet Take 17 g by mouth daily as needed for constipation       senna (SENOKOT) 8.6 MG tablet Take 1 tablet by mouth 2 times daily as needed for constipation         Medication Changes/Rationale:     All (new) bowel meds DC'd in TCU d/t loose stools    (new) Metamucil 1 Tbsp daily for loose stools    Discontinue Lantus.    Discontinue sliding scale Novolog.    Controlled medications sent with patient:   not applicable/none     ROS:   Limited secondary to cognitive impairment but today pt reports 10 point ROS of systems including Constitutional, Eyes, Respiratory, Cardiovascular, Gastroenterology, Genitourinary,  Integumentary, Musculoskeletal, Psychiatric were all negative except for pertinent positives noted in my HPI.    Physical Exam:   Vitals: /46   Pulse 56   Temp 97.4  F (36.3  C)   Resp 19   Ht 1.829 m (6')   Wt 78.5 kg (173 lb)   SpO2 97%   BMI 23.46 kg/m     BMI= Body mass index is 23.46 kg/m .  GENERAL APPEARANCE:  Alert, in no distress, thin  RESP:  respiratory effort and palpation of chest normal, auscultation of lungs clear , no respiratory distress  CV:  Palpation and auscultation of heart done , rate and rhythm regular, no murmur, no LE peripheral edema, significant PVD changes present  ABDOMEN:  normal bowel sounds, soft, nontender, no hepatosplenomegaly or other masses  M/S:   Gait and station ambulating with walker, Digits and nails with arthritic changes, reduced muscle mass  SKIN:  Inspection and Palpation of skin and subcutaneous tissue with significant PVD changes present, pale  PSYCH:  insight and judgement, memory with impairment, affect and mood normal, follows commands readily         SNF labs:   CBC RESULTS:   Recent Labs   Lab Test 06/05/19  0705 05/29/19  0738   WBC 29.9* 26.4*   RBC 3.54* 3.62*   HGB 9.5* 9.7*   HCT 30.6* 31.0*   MCV 86 86   MCH 26.8 26.8   MCHC 31.0* 31.3*   RDW 15.9* 15.3*    416       Last Basic Metabolic Panel:  Recent Labs   Lab Test 05/29/19  0738      POTASSIUM 4.1   CHLORIDE 101   PREETI 8.9   CO2 27   BUN 12   CR 0.68   *       DISCHARGE PLAN:    Follow up labs: per Heme-Onc recommendations     Medical Follow Up:      Follow up with primary care provider in 1-2 weeks, Follow up with specialist as planned     MTM referral needed and placed by this provider: No    Current [place] scheduled appointments: N/A    Discharge Services: Home Care:  Occupational Therapy, Physical Therapy, Registered Nurse and Home Health Aide      TOTAL DISCHARGE TIME:   Greater than 30 minutes  Electronically signed by:  MARIANELA Smith CNP            Documentation of Face-to-Face and Certification for Home Health Services     Patient: Gerber Mayen   YOB: 1929  MR Number: [id number]  Today's Date: 6/4/2019    I certify that patient: Gerber Mayen is under my care and that I, or a nurse practitioner or physician's assistant working with me, had a face-to-face encounter that meets the physician face-to-face encounter requirements with this patient on: 6/5/2019.    This encounter with the patient was in whole, or in part, for the following medical condition, which is the primary reason for home health care: weakness, CLL, BPH with urinary retention, pancreatic cancer with mets to liver (likely), afib, DM2.    I certify that, based on my findings, the following services are medically necessary home health services: Nursing, Occupational Therapy and Physical Therapy.    My clinical findings support the need for the above services because: Nurse is needed: To assess VS after changes in medications or other medical regimen. and for teaching regarding DM2 management as insulin had been prescribed in TCU but pt declined doing at home. Also monitoring for urinary retention.., Occupational Therapy Services are needed to assess and treat cognitive ability and address ADL safety due to impairment in ADL independence. and Physical Therapy Services are needed to assess and treat the following functional impairments: ADL indpendence, mobility, .    Further, I certify that my clinical findings support that this patient is homebound (i.e. absences from home require considerable and taxing effort and are for medical reasons or [employer] services or infrequently or of short duration when for other reasons) because: Requires assistance of another person or specialized equipment to access medical services because patient: Is prone to wander/get lost without assistance., Is unable to exit home safely on own due to: cognitive impairment,  limited mobility., Is unable to operate assistive equipment on their own., Is unable to walk greater than 120 feet without rest., Range of motion limitations prevents ability to exit home safely. and Requires supervision of another for safe transfer...    Based on the above findings. I certify that this patient is confined to the home and needs intermittent skilled nursing care, physical therapy and/or speech therapy.  The patient is under my care, and I have initiated the establishment of the plan of care.  This patient will be followed by a physician who will periodically review the plan of care.  Physician/Provider to provide follow up care: Luis Felipe Hernandez    Responsible Medicare certified PECOS Physician: Yuri Presley MD  Physician Signature: See electronic signature associated with these discharge orders.  Date: 6/4/2019    Electronically Signed by Dr Yuri Presley on 06/05/19                     Sincerely,        MARIANELA Smith CNP

## 2019-09-03 NOTE — PROGRESS NOTES
[place] [facility]  PRIMARY CARE PROVIDER AND CLINIC:  Luis Felipe [facility], [address] [facility] [address]  Chief Complaint   Patient presents with     [facility] Care     [place] Medical Record Number:  [id number]  Place of Service where encounter took place:  [facility] ([facility]) [[id number]]    Gerber Mayen  is a 89 year old  (11/30/1929), Admitted to the facility from home.  Admitted to this facility for  rehab, medical management, nursing care and hospice.    Per Hospice Records:  Primary hospice diagnosis: Liver metastases  Complicating medical conditions (comorbid/secondary): Retroperitoneal mass     89 years old  gentleman who was found to have a large retroperitoneal/epigastric (6.3 x 5.7 cm) mass during hospitalization in late May. He was found to have multiple liver metastases. The mass is thought to perhaps arise from the uncinate process (of pancreas) versus the duodenum. Pt declined biopsy. He was discharged from hospital to rehabilitation. He has unfortunately been physically declining and becoming weaker.     Patient has been falling at home and his elderly wife Ly is no longer able to care for him. He admitted to LTC on [facility] hospice.     Met with patient today in his room. He is alert to self only, sitting in his wheelchair. He is looking for his wallet. He denies pain. His breathing is non labored. No nsg concerns reported.     CODE STATUS/ADVANCE DIRECTIVES DISCUSSION:   DNR only  Patient's living condition: lives alone  ALLERGIES: Codeine; Diatrizoate; and Iodine  PAST MEDICAL HISTORY:  has no past medical history on file.  PAST SURGICAL HISTORY:   has a past surgical history that includes REMV CATARACT EXTRACAP,INSERT LENS (8/21/2008) and REMV CATARACT EXTRACAP,INSERT LENS (09/16/10).  FAMILY HISTORY: family history is not on file.  SOCIAL HISTORY:       Post Discharge Medication Reconciliation Status: discharge medications  reconciled, continue medications without change    Current Outpatient Medications   Medication Sig Dispense Refill     acetaminophen (TYLENOL) 325 MG tablet Take 650 mg by mouth every 4 hours as needed for mild pain        atropine 1 % SOLN Place 2 drops under the tongue every 12 hours as needed for secretions       Cinnamon Bark POWD Take 1,000 mg by mouth daily       CRANBERRY PO Take 1 tablet by mouth daily       insulin aspart (NOVOLOG FLEXPEN) 100 UNIT/ML pen Inject Subcutaneous 3 times daily (with meals) Per Sliding Scale;   If Blood Sugar is 200 to 249, give 1 Units.  If Blood Sugar is 250 to 299, give 2 Units.  If Blood Sugar is 300 to 349, give 3 Units.  If Blood Sugar is 350 to 399, give 4 Units.  If Blood Sugar is greater than 399, give 5 Units.  If Blood Sugar is greater than 400, call MD.       insulin glargine (LANTUS SOLOSTAR PEN) 100 UNIT/ML pen Inject 8 Units Subcutaneous every morning       LORAZEPAM PO Take 0.25 mg by mouth every 4 hours as needed for anxiety       magnesium oxide 400 MG CAPS Take 1 tablet by mouth daily       metFORMIN (GLUCOPHAGE) 1000 MG tablet Take 1,000 mg by mouth 2 times daily (with meals)       morphine 2.5 MG solu-tab Take 2.5 mg by mouth every 2 hours as needed for shortness of breath / dyspnea or moderate to severe pain       Multiple Vitamins-Minerals (MULTIVITAMIN ADULT PO) Take 1 tablet by mouth daily       SUPER B COMPLEX/C PO Take 1 tablet by mouth daily        vitamin B-12 (CYANOCOBALAMIN) 1000 MCG tablet Take 1,000 mcg by mouth daily       vitamin D3 (CHOLECALCIFEROL) 1000 units (25 mcg) tablet Take 2,000 Units by mouth daily         ROS:  4 point ROS including Respiratory, CV, GI and , other than that noted in the HPI,  is negative    Vitals:  /60   Pulse 62   Temp 98.4  F (36.9  C)   Resp 16   SpO2 98%   Exam:  GENERAL APPEARANCE:  Alert, in no distress  RESP:  lungs clear to auscultation , no respiratory distress  CV:  regular rate and rhythm, no  murmur, rub, or gallop, no edema  ABDOMEN:  no guarding or rebound, bowel sounds normal  PSYCH:  memory impaired , affect and mood normal    Lab/Diagnostic data:  not following routine labs, previous labs reviewed in Epic    ASSESSMENT/PLAN:  Pancreatic cancer metastasized to liver (H)  - diagnosed May '19, patient declined biopsy  - general decline, started on hospice w/ Olamide T    Chronic lymphocytic leukemia (H)  - diagnosed May '19  - no longer following labs, patient's goal is comfort on hospice    Type 2 diabetes mellitus without complication, without long-term current use of insulin (H)  - last A1c 7.8% march '19  - no longer on insulin, on metformin- consider stopping  - not following blood sugars    Chronic atrial fibrillation (H)  - rate controlled  - no on [facility]    Hospice care patient- Olamide WILDE  - goal is comfort, patient has declined tx for CA  - pain controlled at this time       Electronically signed by:  MARIANELA Matson CNP

## 2019-09-03 NOTE — LETTER
9/3/2019        RE: Gerber Mayen  [address] [facility] [address]        [place] [facility]IAT[facility]IC [facility]  PRIMARY CARE PROVIDER AND CLINIC:  Luis Felipe [facility], [address] [facility] [address]  Chief Complaint   Patient presents with     [facility] Medical Record Number:  [id number]  Place of Service where encounter took place:  [facility] ([facility] [[id number]]    Gerber Mayen  is a 89 year old  (11/30/1929), Admitted to the facility from home.  Admitted to this facility for  rehab, medical management, nursing care and hospice.    Per Hospice Records:  Primary hospice diagnosis: Liver metastases  Complicating medical conditions (comorbid/secondary): Retroperitoneal mass     89 years old  gentleman who was found to have a large retroperitoneal/epigastric (6.3 x 5.7 cm) mass during hospitalization in late May. He was found to have multiple liver metastases. The mass is thought to perhaps arise from the uncinate process (of pancreas) versus the duodenum. Pt declined biopsy. He was discharged from hospital to rehabilitation. He has unfortunately been physically declining and becoming weaker.     Patient has been falling at home and his elderly wife Ly is no longer able to care for him. He admitted to LTC on [facility] hospice.     Met with patient today in his room. He is alert to self only, sitting in his wheelchair. He is looking for his wallet. He denies pain. His breathing is non labored. No nsg concerns reported.     CODE STATUS/ADVANCE DIRECTIVES DISCUSSION:   DNR only  Patient's living condition: lives alone  ALLERGIES: Codeine; Diatrizoate; and Iodine  PAST MEDICAL HISTORY:  has no past medical history on file.  PAST SURGICAL HISTORY:   has a past surgical history that includes REMV CATARACT EXTRACAP,INSERT LENS (8/21/2008) and REMV CATARACT EXTRACAP,INSERT LENS (09/16/10).  FAMILY HISTORY: family history is not on  file.  SOCIAL HISTORY:       Post Discharge Medication Reconciliation Status: discharge medications reconciled, continue medications without change    Current Outpatient Medications   Medication Sig Dispense Refill     acetaminophen (TYLENOL) 325 MG tablet Take 650 mg by mouth every 4 hours as needed for mild pain        atropine 1 % SOLN Place 2 drops under the tongue every 12 hours as needed for secretions       Cinnamon Bark POWD Take 1,000 mg by mouth daily       CRANBERRY PO Take 1 tablet by mouth daily       insulin aspart (NOVOLOG FLEXPEN) 100 UNIT/ML pen Inject Subcutaneous 3 times daily (with meals) Per Sliding Scale;   If Blood Sugar is 200 to 249, give 1 Units.  If Blood Sugar is 250 to 299, give 2 Units.  If Blood Sugar is 300 to 349, give 3 Units.  If Blood Sugar is 350 to 399, give 4 Units.  If Blood Sugar is greater than 399, give 5 Units.  If Blood Sugar is greater than 400, call MD.       insulin glargine (LANTUS SOLOSTAR PEN) 100 UNIT/ML pen Inject 8 Units Subcutaneous every morning       LORAZEPAM PO Take 0.25 mg by mouth every 4 hours as needed for anxiety       magnesium oxide 400 MG CAPS Take 1 tablet by mouth daily       metFORMIN (GLUCOPHAGE) 1000 MG tablet Take 1,000 mg by mouth 2 times daily (with meals)       morphine 2.5 MG solu-tab Take 2.5 mg by mouth every 2 hours as needed for shortness of breath / dyspnea or moderate to severe pain       Multiple Vitamins-Minerals (MULTIVITAMIN ADULT PO) Take 1 tablet by mouth daily       SUPER B COMPLEX/C PO Take 1 tablet by mouth daily        vitamin B-12 (CYANOCOBALAMIN) 1000 MCG tablet Take 1,000 mcg by mouth daily       vitamin D3 (CHOLECALCIFEROL) 1000 units (25 mcg) tablet Take 2,000 Units by mouth daily         ROS:  4 point ROS including Respiratory, CV, GI and , other than that noted in the HPI,  is negative    Vitals:  /60   Pulse 62   Temp 98.4  F (36.9  C)   Resp 16   SpO2 98%   Exam:  GENERAL APPEARANCE:  Alert, in no  distress  RESP:  lungs clear to auscultation , no respiratory distress  CV:  regular rate and rhythm, no murmur, rub, or gallop, no edema  ABDOMEN:  no guarding or rebound, bowel sounds normal  PSYCH:  memory impaired , affect and mood normal    Lab/Diagnostic data:  not following routine labs, previous labs reviewed in Epic    ASSESSMENT/PLAN:  Pancreatic cancer metastasized to liver (H)  - diagnosed May '19, patient declined biopsy  - general decline, started on hospice w/ Olamide T    Chronic lymphocytic leukemia (H)  - diagnosed May '19  - no longer following labs, patient's goal is comfort on hospice    Type 2 diabetes mellitus without complication, without long-term current use of insulin (H)  - last A1c 7.8% march '19  - no longer on insulin, on metformin- consider stopping  - not following blood sugars    Chronic atrial fibrillation (H)  - rate controlled  - no on [facility]    Hospice care patient- Mary T  - goal is comfort, patient has declined tx for CA  - pain controlled at this time       Electronically signed by:  MARIANELA Matson CNP                       Sincerely,        MARIANELA Matson CNP

## 2019-09-09 NOTE — PROGRESS NOTES
[facility]  PRIMARY CARE PROVIDER AND CLINIC:  [facility], [address] [place] [id number]  Chief Complaint   Patient presents with     Establish Care     [facility] Record Number:  [id number]  Place of Service where encounter took place:  [facility] [[id number]]    Gerber Mayen  is a 89 year old  (11/30/1929), admitted to the above facility from home..  Admitted to this facility for  rehab, medical management and nursing care.    HPI:    HPI information obtained from: facility staff, patient report and [facility] chart review.   - Pt with PMH notable for recent retro abdominal mass with hepatic mets, declined bx, transferred to [facility] for rehab, then sent home, continued to show progressive weakness, with recurrent falls at homes, admitted to [facility]TC on [facility].     Updates on Status Since Skilled nursing Admission:   - Resident seen and examined. Denies pain, reports doing pretty good, reports sleep and appetite are fine, no n/v  Or BM issue.   - reports mood is fine.   - RN and GNP report no concern.     CODE STATUS/ADVANCE DIRECTIVES DISCUSSION:   DNR only  Patient's living condition: lives alone  ALLERGIES: Codeine; Diatrizoate; and Iodine  PAST MEDICAL HISTORY:  has no past medical history on file.  PAST SURGICAL HISTORY:   has a past surgical history that includes REMV CATARACT EXTRACAP,INSERT LENS (8/21/2008) and REMV CATARACT EXTRACAP,INSERT LENS (09/16/10).  FAMILY HISTORY: family history is not on file.  SOCIAL HISTORY:       Post Discharge Medication Reconciliation Status: discharge medications reconciled and changed, per note/orders (see AVS)  Current Outpatient Medications   Medication Sig Dispense Refill     acetaminophen (TYLENOL) 325 MG tablet Take 650 mg by mouth every 4 hours as needed for mild pain        atropine 1 % SOLN Place 2 drops under the tongue every 12 hours as needed for secretions       Cinnamon  Bark POWD Take 1,000 mg by mouth daily       CRANBERRY PO Take 1 tablet by mouth daily       insulin aspart (NOVOLOG FLEXPEN) 100 UNIT/ML pen Inject Subcutaneous 3 times daily (with meals) Per Sliding Scale;   If Blood Sugar is 200 to 249, give 1 Units.  If Blood Sugar is 250 to 299, give 2 Units.  If Blood Sugar is 300 to 349, give 3 Units.  If Blood Sugar is 350 to 399, give 4 Units.  If Blood Sugar is greater than 399, give 5 Units.  If Blood Sugar is greater than 400, call MD.       insulin glargine (LANTUS SOLOSTAR PEN) 100 UNIT/ML pen Inject 8 Units Subcutaneous every morning       LORAZEPAM PO Take 0.25 mg by mouth every 4 hours as needed for anxiety       magnesium oxide 400 MG CAPS Take 1 tablet by mouth daily       metFORMIN (GLUCOPHAGE) 1000 MG tablet Take 1,000 mg by mouth 2 times daily (with meals)       morphine 2.5 MG solu-tab Take 2.5 mg by mouth every 2 hours as needed for shortness of breath / dyspnea or moderate to severe pain       Multiple Vitamins-Minerals (MULTIVITAMIN ADULT PO) Take 1 tablet by mouth daily       SUPER B COMPLEX/C PO Take 1 tablet by mouth daily        vitamin B-12 (CYANOCOBALAMIN) 1000 MCG tablet Take 1,000 mcg by mouth daily       vitamin D3 (CHOLECALCIFEROL) 1000 units (25 mcg) tablet Take 2,000 Units by mouth daily         ROS:  10 point ROS of systems including Constitutional, Eyes, Respiratory, Cardiovascular, Gastroenterology, Genitourinary, Integumentary, Musculoskeletal, Psychiatric were all negative except for pertinent positives noted in my HPI.    Vitals:  There were no vitals taken for this visit.  Exam:  GENERAL APPEARANCE:  in no distress, cooperative  ENT:  Mouth and posterior oropharynx normal, moist mucous membranes, oral mucosa moist, no lesion noted.   EYES:  EOMI, Pupil rounded and equal.  RESP:  lungs clear to auscultation   CV:  S1S2 audible, regular HR, *subtle systolic murmur appreciated over lower left side of sternal border.   ABDOMEN:  soft, NT/ND,  BS audible. no mass appreciated on palpation.   M/S:   no joint deformity noted on observation.   SKIN:  No rash.   NEURO:   No NFD appreciated on observation. Hand  5/5 b/l  PSYCH:  normal insight, judgement and memory, affect and mood normal    Lab/Diagnostic data: Reviewed with patient in office    ASSESSMENT/PLAN:  Type 2 diabetes mellitus without complication, without long-term current use of insulin (H)  - No results found for: A1C. On metformin 1000 mg bid. Reduce if oral intake goes down.     Chronic atrial fibrillation (H)  - CVR. Not on meds. Stable.     Chronic lymphocytic leukemia (H): adding to multiple comorbidities and life expectancy reduction.     Polypharmacy:  - spoke at length about multiple OTC meds he is on, and stopping them, agreed- see under order.     Benign prostatic hyperplasia with incomplete bladder emptying: no concern, not on meds.     Pancreatic cancer metastasized to liver (H)  [facility]- [facility]  - Symptoms managed by  GNP and Hospice Team.    transcribed by : Tonia Murdock  Orders:  1. Discontinue B Complex, Calcium 500 & D, Cinnamon Cap, Cranberry tab, Magnesium tab, Multivitamin tab, and Vitamin D3      Electronically signed by:  Yuri Presley MD

## 2019-09-09 NOTE — LETTER
9/9/2019        RE: Gerber Mayen  [address] [facility] [id number]        [place] [facility]  PRIMARY CARE PROVIDER AND CLINIC:  [facility], [address] [facility] [address]  Chief Complaint   Patient presents with     Establish Care     [facility] Record Number:  [id number]  Place of Service where encounter took place:  [facility] [[id number]]    Gerber Mayen  is a 89 year old  (11/30/1929), admitted to the above facility from home..  Admitted to this facility for  rehab, medical management and nursing care.    HPI:    HPI information obtained from: facility staff, patient report and [facility] chart review.   - Pt with PMH notable for recent retro abdominal mass with hepatic mets, declined bx, transferred to [facility] for rehab, then sent home, continued to show progressive weakness, with recurrent falls at homes, admitted to LTC on [facility].     Updates on Status Since Skilled nursing Admission:   - Resident seen and examined. Denies pain, reports doing pretty good, reports sleep and appetite are fine, no n/v  Or BM issue.   - reports mood is fine.   - RN and GNP report no concern.     CODE STATUS/ADVANCE DIRECTIVES DISCUSSION:   DNR only  Patient's living condition: lives alone  ALLERGIES: Codeine; Diatrizoate; and Iodine  PAST MEDICAL HISTORY:  has no past medical history on file.  PAST SURGICAL HISTORY:   has a past surgical history that includes REMV CATARACT EXTRACAP,INSERT LENS (8/21/2008) and REMV CATARACT EXTRACAP,INSERT LENS (09/16/10).  FAMILY HISTORY: family history is not on file.  SOCIAL HISTORY:       Post Discharge Medication Reconciliation Status: discharge medications reconciled and changed, per note/orders (see AVS)  Current Outpatient Medications   Medication Sig Dispense Refill     acetaminophen (TYLENOL) 325 MG tablet Take 650 mg by mouth every 4 hours as needed for mild pain         atropine 1 % SOLN Place 2 drops under the tongue every 12 hours as needed for secretions       Cinnamon Bark POWD Take 1,000 mg by mouth daily       CRANBERRY PO Take 1 tablet by mouth daily       insulin aspart (NOVOLOG FLEXPEN) 100 UNIT/ML pen Inject Subcutaneous 3 times daily (with meals) Per Sliding Scale;   If Blood Sugar is 200 to 249, give 1 Units.  If Blood Sugar is 250 to 299, give 2 Units.  If Blood Sugar is 300 to 349, give 3 Units.  If Blood Sugar is 350 to 399, give 4 Units.  If Blood Sugar is greater than 399, give 5 Units.  If Blood Sugar is greater than 400, call MD.       insulin glargine (LANTUS SOLOSTAR PEN) 100 UNIT/ML pen Inject 8 Units Subcutaneous every morning       LORAZEPAM PO Take 0.25 mg by mouth every 4 hours as needed for anxiety       magnesium oxide 400 MG CAPS Take 1 tablet by mouth daily       metFORMIN (GLUCOPHAGE) 1000 MG tablet Take 1,000 mg by mouth 2 times daily (with meals)       morphine 2.5 MG solu-tab Take 2.5 mg by mouth every 2 hours as needed for shortness of breath / dyspnea or moderate to severe pain       Multiple Vitamins-Minerals (MULTIVITAMIN ADULT PO) Take 1 tablet by mouth daily       SUPER B COMPLEX/C PO Take 1 tablet by mouth daily        vitamin B-12 (CYANOCOBALAMIN) 1000 MCG tablet Take 1,000 mcg by mouth daily       vitamin D3 (CHOLECALCIFEROL) 1000 units (25 mcg) tablet Take 2,000 Units by mouth daily         ROS:  10 point ROS of systems including Constitutional, Eyes, Respiratory, Cardiovascular, Gastroenterology, Genitourinary, Integumentary, Musculoskeletal, Psychiatric were all negative except for pertinent positives noted in my HPI.    Vitals:  There were no vitals taken for this visit.  Exam:  GENERAL APPEARANCE:  in no distress, cooperative  ENT:  Mouth and posterior oropharynx normal, moist mucous membranes, oral mucosa moist, no lesion noted.   EYES:  EOMI, Pupil rounded and equal.  RESP:  lungs clear to auscultation   CV:  S1S2 audible,  regular HR, *subtle systolic murmur appreciated over lower left side of sternal border.   ABDOMEN:  soft, NT/ND, BS audible. no mass appreciated on palpation.   M/S:   no joint deformity noted on observation.   SKIN:  No rash.   NEURO:   No NFD appreciated on observation. Hand  5/5 b/l  PSYCH:  normal insight, judgement and memory, affect and mood normal    Lab/Diagnostic data: Reviewed with patient in office    ASSESSMENT/PLAN:  Type 2 diabetes mellitus without complication, without long-term current use of insulin (H)  - No results found for: A1C. On metformin 1000 mg bid. Reduce if oral intake goes down.     Chronic atrial fibrillation (H)  - CVR. Not on meds. Stable.     Chronic lymphocytic leukemia (H): adding to multiple comorbidities and life expectancy reduction.     Polypharmacy:  - spoke at length about multiple OTC meds he is on, and stopping them, agreed- see under order.     Benign prostatic hyperplasia with incomplete bladder emptying: no concern, not on meds.     Pancreatic cancer metastasized to liver (H)  [facility] care [facility]- [facility]  - Symptoms managed by  GNP and Hospice Team.    transcribed by : Tonia Murdock  Orders:  1. Discontinue B Complex, Calcium 500 & D, Cinnamon Cap, Cranberry tab, Magnesium tab, Multivitamin tab, and Vitamin D3      Electronically signed by:  Yuri Presley MD                       Sincerely,        Yuri Presley MD

## 2019-09-22 NOTE — TELEPHONE ENCOUNTER
[facility] reporting that Gerber passed in his sleep this AM at 550.      Ok to release the body    Electronically signed by Madeline Sweeney RN, CNP